# Patient Record
Sex: MALE | Race: BLACK OR AFRICAN AMERICAN | NOT HISPANIC OR LATINO | Employment: PART TIME | URBAN - METROPOLITAN AREA
[De-identification: names, ages, dates, MRNs, and addresses within clinical notes are randomized per-mention and may not be internally consistent; named-entity substitution may affect disease eponyms.]

---

## 2017-02-15 ENCOUNTER — ALLSCRIPTS OFFICE VISIT (OUTPATIENT)
Dept: OTHER | Facility: OTHER | Age: 22
End: 2017-02-15

## 2017-02-15 DIAGNOSIS — M41.125 ADOLESCENT IDIOPATHIC SCOLIOSIS OF THORACOLUMBAR REGION: ICD-10-CM

## 2017-04-18 ENCOUNTER — HOSPITAL ENCOUNTER (OUTPATIENT)
Dept: RADIOLOGY | Facility: HOSPITAL | Age: 22
Discharge: HOME | End: 2017-04-18
Payer: COMMERCIAL

## 2017-04-18 ENCOUNTER — TRANSCRIBE ORDERS (OUTPATIENT)
Dept: RADIOLOGY | Facility: HOSPITAL | Age: 22
End: 2017-04-18

## 2017-04-18 DIAGNOSIS — M41.125 ADOLESCENT IDIOPATHIC SCOLIOSIS OF THORACOLUMBAR REGION: ICD-10-CM

## 2017-04-18 PROCEDURE — 72082 X-RAY EXAM ENTIRE SPI 2/3 VW: CPT

## 2017-04-24 ENCOUNTER — ALLSCRIPTS OFFICE VISIT (OUTPATIENT)
Dept: OTHER | Facility: OTHER | Age: 22
End: 2017-04-24

## 2017-04-24 DIAGNOSIS — M41.125 ADOLESCENT IDIOPATHIC SCOLIOSIS OF THORACOLUMBAR REGION: ICD-10-CM

## 2017-05-03 ENCOUNTER — GENERIC CONVERSION - ENCOUNTER (OUTPATIENT)
Dept: OTHER | Facility: OTHER | Age: 22
End: 2017-05-03

## 2018-01-14 VITALS
SYSTOLIC BLOOD PRESSURE: 117 MMHG | WEIGHT: 148 LBS | DIASTOLIC BLOOD PRESSURE: 76 MMHG | HEIGHT: 69 IN | HEART RATE: 91 BPM | BODY MASS INDEX: 21.92 KG/M2

## 2018-01-15 VITALS
OXYGEN SATURATION: 100 % | TEMPERATURE: 95 F | DIASTOLIC BLOOD PRESSURE: 74 MMHG | WEIGHT: 148 LBS | HEIGHT: 69 IN | SYSTOLIC BLOOD PRESSURE: 108 MMHG | BODY MASS INDEX: 21.92 KG/M2 | RESPIRATION RATE: 20 BRPM | HEART RATE: 69 BPM

## 2018-01-17 NOTE — PROGRESS NOTES
Assessment    1  Encounter for preventive health examination (V70 0) (Z00 00)   2  Acne (706 1) (L70 9)   3  Adolescent idiopathic scoliosis of thoracolumbar region (737 30) (M41 125)   4  Myopia of both eyes (367 1) (H52 13)   5  Attention deficit hyperactivity disorder (ADHD) (314 01) (F90 9)    Plan   Acne    · Benzoyl Peroxide-Erythromycin 5-3 % External Gel; APPLY  AND RUB  IN A THIN  FILM TO AFFECTED AREAS TWICE DAILY  (AM AND PM)  Adolescent idiopathic scoliosis of thoracolumbar region    · XR ENTIRE SPINE 2-3 VIEW ( scoliosis); Status:Active; Requested for:61Cwc9933;    · *1 - [facility] (ORTHOPEDIC SURGERY ) Physician  Referral  Evaluation and Treatment: the expectation is that the referred to  provider will communicate back to the patient on treatment options  Status: Active   Requested for: 49SLG4373  Care Summary provided  : Yes  Depression screening    · *VB-Depression Screening; Status:Complete;   Done: [id number] 09:30AM    [facility] (OPTHAMOLOGY ) Physician Referral  Consult Only: the expectation is that the referring provider will communicate back to the patient on treatment options  Evaluation and Treatment: the expectation is that the referred to provider will communicate back to the patient on treatment options  Status: Hold For - Scheduling  Requested for: 15VIH8709  Ordered; For: Myopia of both eyes; Ordered By: Heather Yanes  Performed:   Due: 13PWQ9806     Discussion/Summary  Impression: health maintenance visit  Currently, he eats a healthy diet and has an adequate exercise regimen  Prostate cancer screening: PSA is not indicated  Testicular cancer screening: the risks and benefits of testicular cancer screening were discussed and monthly self testicular exam was advised  Testing was done today for Denies sexual activity  Colorectal cancer screening: colorectal cancer screening is not indicated  The Requested immunization record   He was advised to be evaluated by an ophthalmologist  Advice and education were given regarding nutrition, reproductive health, cardiovascular risk reduction, tobacco cessation and alcohol use  Patient discussion: discussed with the patient  IMPACT form completed and given to patient with copy to scanning  Referred for xray of back for scoliosis  Has had xray in the past but was unable to follow up  Will request records for comparison  Referred to orthopedics for evaluation of scoliosis  Referred to dermatology for acne  Will begin benzoyl peroxide/eryth gel, which has helped him in the past  States that he has taken minocycline in the past with no improvement of acne  Follow up as needed  The patient has the current Goals: Quit smoking  The patent has the current Barriers: Patient is non-contemplative at this time  Patient is able to Self-Care  The treatment plan was reviewed with the patient/guardian  The patient/guardian understands and agrees with the treatment plan   The patient was counseled regarding instructions for management, risk factor reductions, patient and family education, impressions, importance of compliance with treatment  New referral to: Opth  Follow up with PCP/Referring Provider  Chief Complaint  NP to established medical care      History of Present Illness  HM, Adult Male: The patient is being seen for a health maintenance evaluation  The last health maintenance visit was 3 months month(s) ago  General Health: He has regular dental visits  The patient brushes 2 time(s) a day and reports his last dental visit was 2/14/17  Lifestyle:  He consumes a diverse and healthy diet  Diet problems: insufficient in fruit and insufficient in vegetables  He does not have any weight concerns  He exercises regularly  (goes to gym 5 times per week)   He uses tobacco  He denies alcohol use  He denies drug use  Reproductive health:  the patient is not sexually active     Screening:   HPI: Here to establish care  Currently living in [place] with foster family since December 5, 2016  Was in senior care program from January 2014 until December 2016  Prior residence was in [place]  Working full time at [employer]  Review of Systems    Constitutional: No fever or chills, feels well, no tiredness, no recent weight gain or weight loss  Eyes: No complaints of eye pain, no red eyes, no discharge from eyes, no itchy eyes  ENT: no complaints of earache, no hearing loss, no nosebleeds, no nasal discharge, no sore throat, no hoarseness  Cardiovascular: No complaints of slow heart rate, no fast heart rate, no chest pain, no palpitations, no leg claudication, no lower extremity  Respiratory: No complaints of shortness of breath, no wheezing, no cough, no SOB on exertion, no orthopnea or PND  Gastrointestinal: No complaints of abdominal pain, no constipation, no nausea or vomiting, no diarrhea or bloody stools  Genitourinary: No complaints of dysuria, no incontinence, no hesitancy, no nocturia, no genital lesion, no testicular pain  Musculoskeletal: Back pain and scoliosis  Integumentary: Acne  Neurological: No compliants of headache, no confusion, no convulsions, no numbness or tingling, no dizziness or fainting, no limb weakness, no difficulty walking  Psychiatric: Is not suicidal, no sleep disturbances, no anxiety or depression, no change in personality, no emotional problems  Endocrine: No complaints of proptosis, no hot flashes, no muscle weakness, no erectile dysfunction, no deepening of the voice, no feelings of weakness  Hematologic/Lymphatic: No complaints of swollen glands, no swollen glands in the neck, does not bleed easily, no easy bruising  Active Problems    1   Depression screening (V79 0) (Z13 89)    Surgical History    · Denied: History of Recent Surgery    Social History    · Current every day smoker (305 1) (F17 200)   · Does not use illicit drugs (N61 66) (Z78 9) · Employed   · No alcohol use   · No preference on Evangelical beliefs    Current Meds   1  Adderall XR 20 MG Oral Capsule Extended Release 24 Hour; TAKE 1 CAPSULE DAILY   FOR ADHD; Therapy: 01GQS5572 to Recorded    Allergies    1  No Known Drug Allergies    Vitals   Recorded: 14PUK9153 09:07AM   Temperature 95 F, Tympanic   Heart Rate 69   Respiration 20   Systolic 716   Diastolic 74   Height 5 ft 9 in   Weight 148 lb    BMI Calculated 21 86   BSA Calculated 1 82   BMI Percentile 39 %   2-20 Stature Percentile 42 %   2-20 Weight Percentile 41 %   O2 Saturation 100     Physical Exam    Constitutional   General appearance: No acute distress, well appearing and well nourished  Eyes   Conjunctiva and lids: No swelling, erythema, or discharge  Pupils and irises: Equal, round and reactive to light  Ears, Nose, Mouth, and Throat   External inspection of ears and nose: Normal     Otoscopic examination: Tympanic membrance translucent with normal light reflex  Canals patent without erythema  Oropharynx: Normal with no erythema, edema, exudate or lesions  Pulmonary   Respiratory effort: No increased work of breathing or signs of respiratory distress  Auscultation of lungs: Clear to auscultation  Cardiovascular   Auscultation of heart: Normal rate and rhythm, normal S1 and S2, without murmurs  Examination of extremities for edema and/or varicosities: Normal     Abdomen   Abdomen: Non-tender, no masses  Liver and spleen: No hepatomegaly or splenomegaly  Lymphatic   Palpation of lymph nodes in neck: No lymphadenopathy  Musculoskeletal   Gait and station: Normal     Digits and nails: Normal without clubbing or cyanosis  Inspection/palpation of joints, bones, and muscles: Abnormal   (Significant scoliosis noted)   Skin   Skin and subcutaneous tissue: Normal without rashes or lesions    Clinical impression: acne (on the scalp, on the entire forehead, on both sides of the back of the neck, on both sides of the front of the neck, on the chest and on the back )  Examination of the skin for lesions: Abnormal   Mutiple tattoo(s) noted  Neurologic   Cranial nerves: Cranial nerves 2-12 intact  Reflexes: 2+ and symmetric  Sensation: No sensory loss  Psychiatric   Orientation to person, place and time: Normal     Mood and affect: Normal        Results/Data  *VB-Depression Screening [id number] 09:30AM Jamarcus Stair     Test Name Result Flag Reference   Depression Scale Result      Depression Screen - Negative For Symptoms     PHQ-2 Adult Depression Screening [id number] 09:28AM User, Ahs     Test Name Result Flag Reference   PHQ-2 Adult Depression Score 0     Over the last two weeks, how often have you been bothered by any of the following problems? Little interest or pleasure in doing things: Not at all - 0  Feeling down, depressed, or hopeless: Not at all - 0   PHQ-2 Adult Depression Screening Negative         Procedure    Procedure: Hearing Acuity Test    Indication: Routine screeing  Audiometry: Normal bilaterally  Hearing in the right ear: 20 decibals at 500 hertz, 20 decibals at 1000 hertz, 20 decibals at 2000 hertz and 20 decibals at 4000 hertz  Hearing in the left ear: 20 decibals at 500 hertz, 20 decibals at 1000 hertz, 20 decibals at 2000 hertz and 20 decibals at 4000 hertz  The patient was cooperative, but Tolerated the procedure well  There were no complications  Procedure: Visual Acuity Test    Indication: routine screening  Inforrmation supplied by  a Snellen chart  Results: 20/40 in the right eye with corrective device, 20/40 in the left eye with corrective device normal in both eyes  Color vision was reported by , screened with the [place] Test and the results were normal    The patient was cooperative, but tolerated the procedure well  There were no complications        Signatures   Electronically signed by : Otis Stanley; Feb 16 2017  4:32AM EST (Author)    Electronically signed by : Lorrain Closs, M D ; Feb 16 2017  1:52PM EST

## 2018-01-17 NOTE — RESULT NOTES
Verified Results  XR ENTIRE SPINE 2-3 VIEW ( scoliosis) 18Apr2017 01:12PM Mannierigo Chesterfrancisco    Order Number: [id number]     Test Name Result Flag Reference   XR ENTIRE SPINE 2-3 VW (scoliosis) (Report)     SCOLIOSIS      INDICATION: Idiopathic scoliosis  COMPARISON: None     VIEWS: Erect PA and lateral views thoracolumbar spine     IMAGES: 8     FINDINGS:     There is no fracture, pathologic bone lesion or congenital segmentation anomaly  There is a C-shaped thoracolumbar scoliosis measuring approximately 18 degrees measured from the superior endplate of T6 through the inferior endplate of L4  The iliac apophyses are fused  IMPRESSION:     C-shaped thoracolumbar scoliosis measuring approximately 18 degrees         Workstation performed: [id number]     Signed by:   Dmitri Hendricks DO   4/20/17       Signatures   Electronically signed by : Otis Stanley [facility]; May  3 2017  8:37PM EST                       (Author)